# Patient Record
(demographics unavailable — no encounter records)

---

## 2025-07-04 NOTE — PLAN
[FreeTextEntry1] : 50 year old P2 presenting for annual exam. -f/u PAP and GC/CT done today -Mammogram done within the year, pt states wnl  -f/u for GYN sono

## 2025-07-04 NOTE — HISTORY OF PRESENT ILLNESS
[FreeTextEntry1] : Patient is a 50 year old P2 presenting for an annual visit. LMP 2014. She is feeling well and is without complaints. She denies vaginal itching, odor and discharge. Denies urinary urgency, frequency and dysuria. Pt states she had a partial hysterectomy 24 years ago. Pt has her cervix and 1 ovary

## 2025-07-23 NOTE — HISTORY OF PRESENT ILLNESS
[FreeTextEntry1] : 51 y/o patient presents to office for GYN sono. Pt states she had a hysterectomy. Pt desires to have her urine sent out due to UTI symptoms.

## 2025-07-23 NOTE — PLAN
[FreeTextEntry1] : 49 y/o patient presents for GYN sono -GYN sono done today wnl (see official sono report)  -The cervix and vagina are visualized and appears normal -Left ovary appears normal  -U&D done today  -F/u for annual

## 2025-07-23 NOTE — PLAN
[FreeTextEntry1] : 51 y/o patient presents for GYN sono -GYN sono done today wnl (see official sono report)  -The cervix and vagina are visualized and appears normal -Left ovary appears normal  -U&D done today  -F/u for annual